# Patient Record
Sex: FEMALE | Race: WHITE
[De-identification: names, ages, dates, MRNs, and addresses within clinical notes are randomized per-mention and may not be internally consistent; named-entity substitution may affect disease eponyms.]

---

## 2019-04-22 ENCOUNTER — HOSPITAL ENCOUNTER (OUTPATIENT)
Dept: HOSPITAL 62 - OD | Age: 64
End: 2019-04-22
Attending: NURSE PRACTITIONER
Payer: MEDICARE

## 2019-04-22 DIAGNOSIS — M79.671: Primary | ICD-10-CM

## 2019-04-22 NOTE — RADIOLOGY REPORT (SQ)
EXAM DESCRIPTION:  FOOT RIGHT COMPLETE



COMPLETED DATE/TIME:  4/22/2019 10:56 am



REASON FOR STUDY:  PAIN IN RIGHT FOOT M79.671  PAIN IN RIGHT FOOT



COMPARISON:  None.



NUMBER OF VIEWS:  Three views.



TECHNIQUE:  AP, lateral and oblique  radiographic images acquired of the right foot.



LIMITATIONS:  None.



FINDINGS:  MINERALIZATION: Normal.

BONES: No acute fracture or dislocation.  Radiographically there is inward turning of the third throu
gh the fifth toes.  Small plantar calcaneal spur.  Accessory cuboid bone, normal anatomic variant.

JOINTS: No effusions.

SOFT TISSUES: No soft tissue swelling.  No foreign body.

OTHER: No other significant finding.



IMPRESSION:  1.  No acute osseous findings.

2. Calcaneal spur.



TECHNICAL DOCUMENTATION:  JOB ID:  [id number]

 2011 [employer]- All Rights Reserved



Reading location - IP/workstation name: [email or web address]

## 2019-09-18 ENCOUNTER — HOSPITAL ENCOUNTER (OUTPATIENT)
Dept: HOSPITAL 62 - WI | Age: 64
End: 2019-09-18
Attending: NURSE PRACTITIONER
Payer: MEDICARE

## 2019-09-18 DIAGNOSIS — Z12.31: Primary | ICD-10-CM

## 2019-09-18 PROCEDURE — 77067 SCR MAMMO BI INCL CAD: CPT

## 2019-09-18 NOTE — WOMENS IMAGING REPORT
EXAM DESCRIPTION:  BILAT SCREENING MAMMO W/CAD



COMPLETED DATE/TIME:  9/18/2019 8:36 am



REASON FOR STUDY:  Z12.31 SCREENING MAMMO Z12.31  ENCNTR SCREEN MAMMOGRAM FOR MALIGNANT NEOPLASM OF B
RE



COMPARISON:  3/13/2018



EXAM PARAMETERS:  Standard craniocaudal and mediolateral oblique views of each breast recorded using 
digital acquisition.

Read with the assistance of CAD.

.[email or web address]  Version 9.2



LIMITATIONS:  None.



FINDINGS:  No suspicious masses, suspicious calcifications or architectural distortion. No areas of c
oncern.



IMPRESSION:  Negative MAMMOGRAM.  BIRADS 1



BREAST DENSITY:  b. There are scattered areas of fibroglandular density.



BIRAD:  ASSESSMENT:  1 NEGATIVE



RECOMMENDATION:  ROUTINE SCREENING



COMMENT:  The patient has been notified of the results by letter per MQ[employer] requirements. Additional no
tification policies are in place for contacting patient with suspicious or incomplete findings.

Quality ID #225: The American College of Radiology recommends an annual screening mammogram for women
 aged 40 years or over. This facility utilizes a reminder system to ensure that all patients receive 
reminder letters, and/or direct phone calls for appointments. This includes reminders for routine scr
eening mammograms, diagnostic mammograms, or other Breast Imaging Interventions when appropriate.  Th
is patient will be placed in the appropriate reminder system.



TECHNICAL DOCUMENTATION:  FINDING NUMBER: (1)

ASSESSMENT: (1)

JOB ID:  [id number]

 2011 [employer]- All Rights Reserved



Reading location - IP/workstation name: [email or web address]

## 2020-11-30 ENCOUNTER — HOSPITAL ENCOUNTER (OUTPATIENT)
Dept: HOSPITAL 62 - WI | Age: 65
End: 2020-11-30
Attending: NURSE PRACTITIONER
Payer: MEDICARE

## 2020-11-30 DIAGNOSIS — Z12.31: Primary | ICD-10-CM

## 2020-11-30 PROCEDURE — 77067 SCR MAMMO BI INCL CAD: CPT

## 2020-11-30 NOTE — WOMENS IMAGING REPORT
EXAM DESCRIPTION:  BILAT SCREENING MAMMO W/CAD



IMAGES COMPLETED DATE/TIME:  11/30/2020 7:40 am



REASON FOR STUDY:  Z12.31 ENCNTR SCREEN MAMMOGRAM FOR MALIGNANT NEOPLASM OF BREAST Z12.31  ENCNTR SCR
EEN MAMMOGRAM FOR MALIGNANT NEOPLASM OF NARINDER



COMPARISON:  9/18/2019



EXAM PARAMETERS:  Standard craniocaudal and mediolateral oblique views of each breast recorded using 
digital acquisition.

Read with the assistance of CAD.

.On license of [facility]  Version 9.2



LIMITATIONS:  None.



FINDINGS:  No suspicious masses, suspicious calcifications or architectural distortion. No areas of c
oncern.



IMPRESSION:  NEGATIVE MAMMOGRAM.  BIRADS 1



BREAST DENSITY:  a. The breasts are almost entirely fatty.



BIRAD:  ASSESSMENT:  1 NEGATIVE



RECOMMENDATION:  ROUTINE SCREENING

Please continue yearly bilateral screening mammography/tomosynthesis in November 2021



COMMENT:  The patient has been notified of the results by letter per [facility] requirements. Additional no
tification policies are in place for contacting patient with suspicious or incomplete findings.

Quality ID #225: The American College of Radiology recommends an annual screening mammogram for women
 aged 40 years or over. This facility utilizes a reminder system to ensure that all patients receive 
reminder letters, and/or direct phone calls for appointments. This includes reminders for routine scr
eening mammograms, diagnostic mammograms, or other Breast Imaging Interventions when appropriate.  Th
is patient will be placed in the appropriate reminder system.



TECHNICAL DOCUMENTATION:  FINDING NUMBER: (1)

ASSESSMENT: (1)

JOB ID:  [id number]

 2011 [employer]- All Rights Reserved



Reading location - IP/workstation name: [id number]